# Patient Record
(demographics unavailable — no encounter records)

---

## 2022-08-01 LAB
ALBUMIN SERPL-MCNC: 4.1 G/DL (ref 3.5–5.2)
ALBUMIN/GLOB SERPL: 1 {RATIO} (ref 1–2.7)
ALP SERPL-CCNC: 66 UNIT/L (ref 40–130)
ALT SERPL-CCNC: 15 UNIT/L (ref 0–50)
ANION GAP SERPL CALC-SCNC: 12 MMOL/L (ref 2–17)
AST SERPL-CCNC: 24 UNIT/L (ref 0–50)
BASOPHILS # BLD AUTO: 0 X10E3/MCL (ref 0–0.2)
BASOPHILS NFR BLD AUTO: 0.4 % (ref 0–2)
BILIRUB SERPL-MCNC: 0.65 MG/DL (ref 0–1.2)
BUN SERPL-MCNC: 14 MG/DL (ref 6–20)
CALCIUM SERPL-MCNC: 9.5 MG/DL (ref 8.6–10)
CHLORIDE SERPL-SCNC: 99 MMOL/L (ref 98–107)
CREAT SERPL-MCNC: 1.2 MG/DL (ref 0.7–1.3)
DEPRECATED HCO3 PLAS-SCNC: 28 MMOL/L (ref 22–29)
EOSINOPHIL # BLD AUTO: 0.1 X10E3/MCL (ref 0–0.5)
EOSINOPHIL NFR BLD AUTO: 1.2 % (ref 0–7)
ERYTHROCYTE [DISTWIDTH] IN BLOOD BY AUTOMATED COUNT: 11.9 % (ref 11–16)
GFR SERPL CREATININE-BSD FRML MDRD: 86 ML/MIN/1.73M²
GLOBULIN SER CALC-MCNC: 3.5 G/DL (ref 1.9–4.4)
GLUCOSE SERPL-MCNC: 107 MG/DL (ref 70–99)
HCT VFR BLD AUTO: 42.6 % (ref 38–52)
HGB BLD-MCNC: 14.4 G/DL (ref 13–17.3)
IMMATURE GRANS (ABS): 0.01 X10E3/MCL (ref 0–0.06)
IMMATURE GRANULOCYTES: 0.1 % (ref 0–0.6)
LACTIC ACID: 0.8 MMOL/L (ref 0.5–2)
LACTIC ACID: 1 MMOL/L (ref 0.5–2)
LYMPHOCYTES # SPEC AUTO: 1 X10E3/MCL (ref 1–3.2)
LYMPHOCYTES NFR BLD AUTO: 10.9 % (ref 15–45)
MCH RBC QN AUTO: 30 PG (ref 27–34.5)
MCHC RBC AUTO-ENTMCNC: 33.8 G/DL (ref 32–36)
MCV RBC AUTO: 88.8 FL (ref 84–100)
MONOCYTES # BLD AUTO: 1.3 X10E3/MCL (ref 0.3–1)
MONOCYTES NFR BLD AUTO: 13.3 % (ref 4–12)
NEUTROPHILS # BLD AUTO: 7 X10E3/MCL (ref 1.6–7.3)
NEUTROPHILS NFR BLD AUTO: 74.1 % (ref 42–74)
OSMOLALITY SERPL CALC.SUM OF ELEC: 278 MOSM/KG (ref 270–287)
PLATELET # BLD AUTO: 199 X10E3/MCL (ref 140–440)
PMV BLD AUTO: 11.3 FL (ref 7.2–13.2)
POTASSIUM SERPL-SCNC: 3.6 MMOL/L (ref 3.5–5.3)
PROT SERPL-MCNC: 7.6 G/DL (ref 6.4–8.3)
RBC # BLD AUTO: 4.8 X10E6/MCL (ref 4–5.6)
SODIUM SERPL-SCNC: 139 MMOL/L (ref 135–145)
WBC # BLD AUTO: 9.5 X10E3/MCL (ref 3.8–10.6)

## 2022-08-02 LAB — FINAL REPORT: NORMAL

## 2022-08-06 LAB — FINAL REPORT: NORMAL

## 2022-08-23 PROBLEM — M71.162 SEPTIC PREPATELLAR BURSITIS OF LEFT KNEE: Status: ACTIVE | Noted: 2022-08-23

## 2022-10-17 NOTE — ED NOTES
ED Triage Note       ED Secondary Triage Entered On:  8/1/2022 16:06 EDT    Performed On:  8/1/2022 16:05 EDT by Giovanni MALCOLM               General Information   Barriers to Learning :   None evident   ED Home Meds Section :   Document assessment   [facility] ED Fall Risk Section :   Document assessment   ED Advance Directives Section :   Document assessment   ED Palliative Screen :   N/A (prefilled for <64yo)   Giovanni MALCOLM - 8/1/2022 16:05 EDT   (As Of: 8/1/2022 16:06:39 EDT)   Diagnoses(Active)    Knee pain-swelling  Date:   8/1/2022 ; Diagnosis Type:   Reason For Visit ; Confirmation:   Complaint of ; Clinical Dx:   Knee pain-swelling ; Classification:   Medical ; Clinical Service:   Emergency medicine ; Code:   PNED ; Probability:   0 ; Diagnosis Code:   [id number]             -    Procedure History   (As Of: 8/1/2022 16:06:39 EDT)     [facility] Fall Risk Assessment Tool   Hx of falling last 3 months ED Fall :   No   Giovanni MALCOLM - 8/1/2022 16:05 EDT   ED Advance Directive   Advance Directive :   No   Giovanni MALCOLM - 8/1/2022 16:05 EDT   Med Hx   Medication List   (As Of: 8/1/2022 16:06:39 EDT)   Normal Order    cefTRIAXone 1gm/100ml NS ADM  :   cefTRIAXone 1gm/100ml NS ADM ;  Status:   Ordered ; Ordered As Mnemonic:   cefTRIAXone ( Rocephin ) ; Simple Display Line:   1 g, 100 mL, 200 mL/hr, IV Piggyback, Once ; Ordering Provider:   Liza GEORGES; Catalog Code:   cefTRIAXone ; Order Dt/Tm:   8/1/2022 15:59:48 EDT          Vancomycin 1500mg/250 ml NS  :   Vancomycin 1500mg/250 ml NS ; Status:   Ordered ; Ordered As Mnemonic:   vancomycin ; Simple Display Line:   1,500 mg, 250 mL, 166.67 mL/hr, IV Piggyback, Once ; Ordering Provider:   Liza GEORGES; Catalog Code:   vancomycin ; Order Dt/Tm:   8/1/2022 15:59:49 EDT          Sodium Chloride 0.9% intravenous solution Bolus  :   Sodium Chloride 0.9% intravenous solution Bolus ; Status:   Completed ; Ordered As Mnemonic:   Sodium Chloride 0.9% bolus ; Simple Display Line:   1,000 mL, 2000 mL/hr, IV Piggyback, Once ; Ordering Provider:   Lenor Ormond S-MD; Catalog Code:   Sodium Chloride 0.9% ;  Order Dt/Tm:   8/1/2022 15:30:10 EDT            Home Meds    clindamycin  :   clindamycin ; Status:   Documented ; Ordered As Mnemonic:   clindamycin 300 mg oral capsule ; Simple Display Line:   TAKE 1 CAPSULE BY MOUTH EVERY 8 HOURS FOR 10 DAYS TAKE WITH THE 150MG CAPSULE ; Catalog Code:   clindamycin ; Order Dt/Tm:   8/1/2022 16:06:34 EDT          HYDROcodone-acetaminophen  :   HYDROcodone-acetaminophen ; Status:   Documented ; Ordered As Mnemonic:   HYDROcodone-acetaminophen 7.5 mg-325 mg oral tablet ; Simple Display Line:   TAKE 1 TABLET BY MOUTH EVERY 4 HOURS AS NEEDED FOR PAIN FOR 3 DAYS ; Catalog Code:   HYDROcodone-acetaminophen ; Order Dt/Tm:   8/1/2022 16:06:34 EDT          ibuprofen  :   ibuprofen ; Status:   Documented ; Ordered As Mnemonic:   ibuprofen 600 mg oral tablet ; Simple Display Line:   TAKE 1 TABLET BY MOUTH 4 TIMES DAILY AS NEEDED FOR PAIN FOR 14 DAYS ; Catalog Code:   ibuprofen ; Order Dt/Tm:   8/1/2022 16:06:34 EDT          sulfamethoxazole-trimethoprim  :   sulfamethoxazole-trimethoprim ; Status:   Documented ; Ordered As Mnemonic:   sulfamethoxazole-trimethoprim 800 mg-160 mg oral tablet ; Simple Display Line:   TAKE 2 TABLETS BY MOUTH TWICE DAILY FOR 7 DAYS DRINK PLENTY OF FLUIDS ; Catalog Code:   sulfamethoxazole-trimethoprim ; Order Dt/Tm:   8/1/2022 16:06:34 EDT

## 2022-10-17 NOTE — ED NOTES
ED Triage Note       ED Triage Adult Entered On:  8/1/2022 14:51 EDT    Performed On:  8/1/2022 14:47 EDT by Claudette Lofts, RN, [place]               Triage   Numeric Rating Pain Scale :   8   Chief Complaint :   Pt on crutches c/o left knee pain and swelling. Pt knee red and warm. Pt knee has been marked by urgent care on Sat/Sun.    [place] [facility]) Mode of Arrival :   Private vehicle   Infectious Disease Documentation :   Document assessment   Patient received chemo or biotherapy last 48 hrs? :   No   Temperature Oral :   36.8 degC(Converted to: 98.2 degF)    Heart Rate Monitored :   106 bpm (HI)    Respiratory Rate :   18 br/min   Systolic Blood Pressure :   166 mmHg (HI)    Diastolic Blood Pressure :   84 mmHg   SpO2 :   100 %   Oxygen Therapy :   Room air   Patient presentation :   None of the above   Chief Complaint or Presentation suggest infection :   Yes   Weight Dosing :   83.4 kg(Converted to: 183 lb 14 oz)    Height :   196 cm(Converted to: 6 ft 5 in)    Body Mass Index Dosing :   22 kg/m2   Hilary Ramirez - 8/1/2022 14:47 EDT   DCP GENERIC CODE   Tracking Acuity :   3   Tracking Group :   ED [facility]acking [facility]   Claudette Lofts, Verlene Charity - 8/1/2022 14:47 EDT   ED General Section :   Document assessment   Pregnancy Status :   N/A   ED Allergies Section :   Document assessment   ED Reason for Visit Section :   Document assessment   Hilary Gordillo 8/1/2022 14:47 EDT   ID Risk Screen Symptoms   Recent Travel History :   No recent travel   TB Symptom Screen :   No symptoms   Last 90 days COVID-19 ID :   No   Close Contact with COVID-19 ID :   No   Last 14 days COVID-19 ID :   No   C. diff Symptom/History ID :   Neither of the above   Patient Pregnant :   None of the above   MRSA/VRE Screening :   None of these apply   CRE Screening :   No   Hilary Gordillo 8/1/2022 14:47 EDT   Allergies   (As Of: 8/1/2022 14:51:01 EDT)   Allergies (Active)   No Known Medication Allergies  Estimated Onset Date:   Unspecified ; Created By: Shasta Bunch RN, [place] Avila; Reaction Status:   Active ; Category:   Drug ; Substance:   No Known Medication Allergies ; Type:    Allergy ; Updated By:   Lázaro Portillo; Reviewed Date:   8/1/2022 14:48 EDT        Psycho-Social   Last 3 mo, thoughts killing self/others :   Patient denies   Right click within box for Suspected Abuse policy link. :   None   Feels Safe Where Live :   Yes   ED Behavioral Activity Rating Scale :   4 - Quiet and awake (normal level of activity)   Lázaro Portillo - 8/1/2022 14:47 EDT   ED Reason for Visit   (As Of: 8/1/2022 14:51:01 EDT)   Diagnoses(Active)    Knee pain-swelling  Date:   8/1/2022 ; Diagnosis Type:   Reason For Visit ; Confirmation:   Complaint of ; Clinical Dx:   Knee pain-swelling ; Classification:   Medical ; Clinical Service:   Emergency medicine ; Code:   PNED ; Probability:   0 ; Diagnosis Code:   [id number]

## 2022-10-17 NOTE — DISCHARGE SUMMARY
ED Clinical Summary                         [facility]  [address]  [place], [place], [address] [phone]           PERSON INFORMATION  Name: Sinan Soto Age:  22 Years : 1997   Sex: Male Language: English PCP: ALEX,  NONE   Marital Status:   Phone: [phone] Med Service: MED-Medicine   MRN:  [id number] Acct# [[id number]] Arrival: 2022 14:43:00   Visit Reason: Knee pain-swelling; LFT KNEE SWOLLEN/RED Acuity: 3 LOS: 000 06:07   Address:      [address] [place] [id number]  Diagnosis:      Cellulitis of left knee  Printed Prescriptions: Allergies      No Known Medication Allergies      Medications Administered During Visit:                  Medication Dose Route   Sodium Chloride 0.9% 1000 mL IV Piggyback   ceftriaxone 1 g IV Piggyback   vancomycin 1500 mg IV Piggyback   Sodium Chloride 0.9% 1000 mL IV Piggyback   acetaminophen 1000 mg Oral       Patient Medication List:              clindamycin (clindamycin 300 mg oral capsule) TAKE 1 CAPSULE BY MOUTH EVERY 8 HOURS FOR 10 DAYS TAKE WITH THE 150MG CAPSULE. HYDROcodone-acetaminophen (HYDROcodone-acetaminophen 7.5 mg-325 mg oral tablet) TAKE 1 TABLET BY MOUTH EVERY 4 HOURS AS NEEDED FOR PAIN FOR 3 DAYS.  ibuprofen (ibuprofen 600 mg oral tablet) TAKE 1 TABLET BY MOUTH 4 TIMES DAILY AS NEEDED FOR PAIN FOR 14 DAYS. sulfamethoxazole-trimethoprim (sulfamethoxazole-trimethoprim 800 mg-160 mg oral tablet) TAKE 2 TABLETS BY MOUTH TWICE DAILY FOR 7 DAYS DRINK PLENTY OF FLUIDS. Major Tests and Procedures: The following procedures and tests were performed during your ED visit.   COMMONPROCEDURES%>  COMMON PROCEDURESCOMMENTS%>          Laboratory Orders  Name Status Details   C Blood Ordered Blood, Stat, ST - Stat, 22 15:29:00 EDT, 22 15:30:00 EDT, Nurse collect, First blood culture   C Blood Ordered Blood, Stat, ST - Stat, 22 15:29:00 EDT, 22 15:30:00 EDT, Nurse collect, Second blood culture   CBCDIFF Completed Blood, Stat, ST - Stat, 08/01/22 15:29:00 EDT, 08/01/22 15:30:00 EDT, Cora Sanabria, Print label Y/N   CMP Completed Blood, Stat, ST - Stat, 08/01/22 15:29:00 EDT, 08/01/22 15:30:00 EDT, Cora Sanabria, Print label Y/N   LA Repeat Completed Blood, Timed Study, TS - Timed Study, 08/01/22 16:29:00 EDT, Once, 08/01/22 16:29:00 EDT, Cora Sanabria, Print label Y/N   Lactic Completed Blood, Stat, ST - Stat, 08/01/22 15:29:00 EDT, 08/01/22 15:30:00 EDT, Cora Sanabria, Print label Y/N               Radiology Orders  Name Status Details   XR Knee 3 Views Left Completed 08/01/22 14:55:00 EDT, STAT 1 hour or less, Reason: Pain, Joint Knee, Transport Mode: STRETCHER, pp_set_radiology_subspecialty               Patient Care Orders  Name Status Details   Cardiac/NIBP/Pulse Ox Monitoring Completed 08/01/22 15:29:00 EDT, This message can only be seen by Nursing, it is not visible to Pharmacy, Laboratory, or Radiology. , 08/01/22 15:29:00 EDT, 08/01/22 15:29:00 EDT, Once   Change attending to Ordered 08/01/22 17:55:00 EDT, Karuna Mcmahon V-DO   ED Assessment Adult Completed 08/01/22 14:51:02 EDT, 08/01/22 14:51:02 EDT   ED Only Oxygen Therapy Completed 08/01/22 15:29:00 EDT, STAT 1 hour or less, 08/01/22 15:29:00 EDT, Keep SAT > 92%   ED Secondary Triage Completed 08/01/22 14:51:02 EDT, 08/01/22 14:51:02 EDT   ED Triage Adult Completed 08/01/22 14:43:34 EDT, 08/01/22 14:43:34 EDT   Employment-related accident Ordered 08/01/22 14:43:34 EDT, 08/01/22 14:43:34 EDT   Intake and Output Ordered 08/01/22 15:29:00 EDT, Once, 08/01/22 15:29:00 EDT, Strict   Saline Lock Insert Completed 08/01/22 15:29:00 EDT, Once, 08/01/22 15:29:00 EDT   Transport Patient Ordered 08/01/22 17:55:00 EDT             PROVIDER INFORMATION               Provider Role Assigned Lauri Goyal JOSÉ MANUEL ED Provider 2022 15:16:44 2022 15:17:53   Mindi Green ED MidLevel 2022 15:17:04 2022 15:17:06   Alessandro GEORGES ED Provider 2022 15:19:41    Gwendolyn MALCOLM ED Nurse 2022 15:20:38 2022 19:20:23   Bandar Diaz RN, Patricia Watson ED Nurse 2022 19:16:10        Attending Physician:  Vera AVENDANO     Admit Doc  Alessandro GEORGES     Consulting Doc  Vera AVENDANO     VITALS INFORMATION  Vital Sign Triage Latest   Temp Oral ORAL_1%>36.8 degC ORAL%>37.6 degC   Temp Temporal TEMPORAL_1%> TEMPORAL%>   Temp Intravascular INTRAVASCULAR_1%> INTRAVASCULAR%>   Temp Axillary AXILLARY_1%> AXILLARY%>   Temp Rectal RECTAL_1%> RECTAL%>   02 Sat 100 % 99 %   Respiratory Rate RATE_1%>18 br/min RATE%>20 br/min   Peripheral Pulse Rate PULSE RATE_1%> PULSE RATE%>   Apical Heart Rate HEART RATE_1%> HEART RATE%>   Blood Pressure BLOOD PRESSURE_1%>/ BLOOD PRESSURE_1%>84 mmHg BLOOD PRESSURE%>137 mmHg / BLOOD PRESSURE%>63 mmHg                 Immunizations      No Immunizations Documented This Visit          DISCHARGE INFORMATION   Discharge Disposition: [address] [place] [address]   Discharge Location:    [facility]   Discharge Date and Time:    2022 20:50:00   ED Checkout Date and Time:    2022 20:50:00     DEPART REASON INCOMPLETE INFORMATION               Depart Action Incomplete Reason   Interactive View/I&O MD Decision to leave incomplete   Patient Education MD Decision to leave incomplete   Follow-up MD Decision to leave incomplete   Patient Understanding MD Decision to leave incomplete               Problems      No Problems Documented              Smoking Status      No Smoking Status Documented         PATIENT EDUCATION INFORMATION  Instructions:             Follow up:          ED PROVIDER DOCUMENTATION     Patient:   Sinan Soto             MRN: [id number]            FIN: [id number]               Age:   22 years     Sex:  Male     :  1997 Associated Diagnoses:   Cellulitis of left knee   Author:   Joesph Kussmaul S-MD      Basic Information   Time seen: Provider Seen (ST)   ED Provider/Time:    Joesph Kussmaul S-MD / 08/01/2022 15:19  . Additional information: Chief Complaint from Nursing Triage Note   Chief Complaint  Chief Complaint: Pt on crutches c/o left knee pain and swelling. Pt knee red and warm. Pt knee has been marked by urgent care on Sat/Sun. (08/01/22 14:47:00). 24-year-old male presents for concerns of progressive left knee swelling and pain. States that his been occurring for the last 3 days. 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 States he went to urgent care, they marked the area and then did a joint aspiration. Patient states that he can bend the knee without much pain but it is very tight. Denies any focal weakness, numbness or tingling. Is having some subjective fever with chills. Went to express care today and they referred him here. Review of Systems             Additional review of systems information: All other systems reviewed and otherwise negative. Health Status   Allergies: Allergic Reactions (Selected)  No Known Medication Allergies. Medications:  (Selected)   Inpatient Medications  Ordered  Sodium Chloride 0.9% bolus: 1,000 mL, 2000 mL/hr, IV Piggyback, Once. Past Medical/ Family/ Social History   Medical history: Reviewed as documented in chart. Surgical history: Reviewed as documented in chart. Family history: Not significant. Social history: Reviewed as documented in chart. Problem list:    No qualifying data available  . Physical Examination               Vital Signs             Time:  8/1/2022 15:32:00. Vital Signs   0/0/1677 26:20 EDT Systolic Blood Pressure 332 mmHg  HI    Diastolic Blood Pressure 84 mmHg    Temperature Oral 36.8 degC    Heart Rate Monitored 106 bpm  HI    Respiratory Rate 18 br/min    SpO2 100 %   . Oxygen saturation. General:  Alert, no acute distress. Skin:  Warm, dry, pink. Head:  Normocephalic. Neck:  Supple, trachea midline. Eye:  Pupils are equal, round and reactive to light, extraocular movements are intact. Ears, nose, mouth and throat:  Oral mucosa moist.   Cardiovascular:  Regular rate and rhythm. Respiratory:  Lungs are clear to auscultation, respirations are non-labored. Musculoskeletal:  Focused left knee exam remarkable for anterior swelling and erythema. Warmth noted. Posterior knee is normal.  Soft tissue swelling noted. Neurovascularly intact. Range of motion limited due to swelling. .   Chest wall   Gastrointestinal:  Soft, Nontender, Non distended. Neurological:  Alert and oriented to person, place, time, and situation, No focal neurological deficit observed, normal sensory observed, normal motor observed. Lymphatics   Psychiatric:  Cooperative, appropriate mood & affect. Medical Decision Making   Rationale:  27-year-old male presents for concerns of left knee pain and swelling. Range of motion is limited due to swelling. He did report that he had some fluid drained off of his knee a couple of days ago with by urgent care. He was diagnosed with cellulitis and then placed on clindamycin. Not having much improvement. Tachycardic to 106 here. Exam is remarkable for fluid on the anterior portion of the left knee with some cellulitic changes. Neurovascularly intact. White count and lactic normal. Paged on call orthopedics for [facility] given bed availability  Based on clinical exam, I think this is prepatellar, discussed with the hospitalist [place] who will accept. Vancomycin and rocephin. Discussed with Dr Frank Allen who is aware of patient being transferred to [facility]. 27-year-old male presents for concerns of left knee pain and swelling. Range of motion is limited due to swelling.   He did report that he had some fluid drained off of his knee a 08/01/22    COMPARISON: None    INDICATION: Knee pain    FINDINGS/IMPRESSION:      No acute fracture or dislocation. No focal osseous lesion. Marked prepatellar  soft tissue swelling, compatible with hematoma or cellulitis. No definite knee  joint effusion. ?  Signed By: Aniyah MARTINEZ    .      Impression and Plan   Diagnosis   Cellulitis of left knee ([id number] L03.116, Discharge, Medical)   Plan   Condition: Stable. Disposition: Admit time  8/1/2022 17:58:00, Place in Observation Telemetry Unit, [facility]. Counseled: Patient, Family, Regarding diagnosis, Regarding diagnostic results, Regarding treatment plan.

## 2022-10-17 NOTE — ED PROVIDER NOTES
Lower Extremity Pain-Swelling *ED        Patient:   Sameer Hardy             MRN: [id number]            FIN: [id number]               Age:   22 years     Sex:  Male     :  1997   Associated Diagnoses:   Cellulitis of left knee   Author:   Abraham GEORGES      Basic Information   Time seen: Provider Seen (ST)   ED Provider/Time:    Abraham GEORGES / 2022 15:19  . Additional information: Chief Complaint from Nursing Triage Note   Chief Complaint  Chief Complaint: Pt on crutches c/o left knee pain and swelling. Pt knee red and warm. Pt knee has been marked by urgent care on Sat/Sun. (22 14:47:00). 51-year-old male presents for concerns of progressive left knee swelling and pain. States that his been occurring for the last 3 days. 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 States he went to urgent care, they marked the area and then did a joint aspiration. Patient states that he can bend the knee without much pain but it is very tight. Denies any focal weakness, numbness or tingling. Is having some subjective fever with chills. Went to express care today and they referred him here. Review of Systems             Additional review of systems information: All other systems reviewed and otherwise negative. Health Status   Allergies: Allergic Reactions (Selected)  No Known Medication Allergies. Medications:  (Selected)   Inpatient Medications  Ordered  Sodium Chloride 0.9% bolus: 1,000 mL, 2000 mL/hr, IV Piggyback, Once. Past Medical/ Family/ Social History   Medical history: Reviewed as documented in chart. Surgical history: Reviewed as documented in chart. Family history: Not significant. Social history: Reviewed as documented in chart. Problem list:    No qualifying data available  . Physical Examination               Vital Signs             Time:  2022 15:32:00.    Vital Signs   2022 14:47 EDT Systolic Blood Pressure 114 mmHg  HI    Diastolic Blood Pressure 84 mmHg    Temperature Oral 36.8 degC    Heart Rate Monitored 106 bpm  HI    Respiratory Rate 18 br/min    SpO2 100 %   . Oxygen saturation. General:  Alert, no acute distress. Skin:  Warm, dry, pink. Head:  Normocephalic. Neck:  Supple, trachea midline. Eye:  Pupils are equal, round and reactive to light, extraocular movements are intact. Ears, nose, mouth and throat:  Oral mucosa moist.   Cardiovascular:  Regular rate and rhythm. Respiratory:  Lungs are clear to auscultation, respirations are non-labored. Musculoskeletal:  Focused left knee exam remarkable for anterior swelling and erythema. Warmth noted. Posterior knee is normal.  Soft tissue swelling noted. Neurovascularly intact. Range of motion limited due to swelling. .   Chest wall   Gastrointestinal:  Soft, Nontender, Non distended. Neurological:  Alert and oriented to person, place, time, and situation, No focal neurological deficit observed, normal sensory observed, normal motor observed. Lymphatics   Psychiatric:  Cooperative, appropriate mood & affect. Medical Decision Making   Rationale:  49-year-old male presents for concerns of left knee pain and swelling. Range of motion is limited due to swelling. He did report that he had some fluid drained off of his knee a couple of days ago with by urgent care. He was diagnosed with cellulitis and then placed on clindamycin. Not having much improvement. Tachycardic to 106 here. Exam is remarkable for fluid on the anterior portion of the left knee with some cellulitic changes. Neurovascularly intact. White count and lactic normal. Paged on call orthopedics for [facility] given bed availability  Based on clinical exam, I think this is prepatellar, discussed with the hospitalist downtown who will accept. Vancomycin and rocephin.  Discussed with Dr Deisi Willard who is aware of patient being transferred to [facility].  59-year-old male presents for concerns of left knee pain and swelling. Range of motion is limited due to swelling. He did report that he had some fluid drained off of his knee a couple of days ago with by urgent care. He was diagnosed with cellulitis and then placed on clindamycin. Not having much improvement. Tachycardic to 106 here. Exam is remarkable for fluid on the anterior portion of the left knee with some cellulitic changes. Neurovascularly intact. White count and lactic normal. Paged on call orthopedics for [facility] given bed availability but no answer as of yet. Based on clinical exam, I think this is prepatellar, [place] who [place]. Vancomycin and rocephin. .   Documents reviewed:  Emergency department nurses' notes, flowsheet, emergency department records, prior records, vital signs.     Results review:  Lab results : Lab View   8/1/2022 16:22 EDT Estimated Creatinine Clearance 111.01 mL/min   8/1/2022 15:46 EDT WBC 9.5 x10e3/mcL    RBC 4.80 x10e6/mcL    Hgb 14.4 g/dL    HCT 42.6 %    MCV 88.8 fL    MCH 30.0 pg    MCHC 33.8 g/dL    RDW 11.9 %    Platelet 684 [id number]    MPV 11.3 fL    Neutro Auto 74.1 %  HI    Neutro Absolute 7.0 x10e3/mcL    Immature Grans Percent 0.1 %    Immature Grans Absolute 0.01 x10e3/mcL    Lymph Auto 10.9 %  LOW    Lymph Absolute 1.0 x10e3/mcL    Mono Auto 13.3 %  HI    Mono Absolute 1.3 x10e3/mcL  HI    Eosinophil Percent 1.2 %    Eos Absolute 0.1 x10e3/mcL    Basophil Auto 0.4 %    Baso Absolute 0.0 x10e3/mcL    Sodium Lvl 139 mmol/L    Potassium Lvl 3.6 mmol/L    Chloride 99 mmol/L    CO2 28 mmol/L    Glucose Random 107 mg/dL  HI    BUN 14 mg/dL    Creatinine Lvl 1.2 mg/dL    AGAP 12 mmol/L    Osmolality Calc 278 mOsm/kg    Calcium Lvl 9.5 mg/dL    Protein Total 7.6 g/dL    Albumin Lvl 4.1 g/dL    Globulin Calc 3.5 g/dL    AG Ratio Calc 1.00    Alk Phos 66 unit/L    AST 24 unit/L    ALT 15 unit/L    eGFR 86 mL/min/1.73mÂ²  LOW    Bili Total 0.65 mg/dL    Lactic Acid Lvl 1.0 mmol/L     . Radiology results:  Rad Results (ST)   XR Knee 3 Views Left  ?  08/01/22 15:30:15  LEFT KNEE, 4 VIEWS 08/01/22    COMPARISON: None    INDICATION: Knee pain    FINDINGS/IMPRESSION:      No acute fracture or dislocation. No focal osseous lesion. Marked prepatellar  soft tissue swelling, compatible with hematoma or cellulitis. No definite knee  joint effusion. ?  Signed By: Drew MARTINEZ    .      Impression and Plan   Diagnosis   Cellulitis of left knee ([id number] L03.116, Discharge, Medical)   Plan   Condition: Stable. Disposition: Admit time  8/1/2022 17:58:00, Place in Observation Telemetry Unit, [facility]. Counseled: Patient, Family, Regarding diagnosis, Regarding diagnostic results, Regarding treatment plan.     Signature Line     Electronically Signed on 08/01/2022 05:58 PM EDT   ________________________________________________   Noris GEORGES      Electronically Signed on 08/01/2022 06:09 PM EDT   ________________________________________________   Noris GEORGES            Modified by: Noris GEORGES on 08/01/2022 04:34 PM EDT      Modified by: Noris GEORGES on 08/01/2022 05:58 PM EDT      Modified by: Noris GEORGES on 08/01/2022 06:09 PM EDT